# Patient Record
Sex: FEMALE | Race: WHITE
[De-identification: names, ages, dates, MRNs, and addresses within clinical notes are randomized per-mention and may not be internally consistent; named-entity substitution may affect disease eponyms.]

---

## 2023-05-02 ENCOUNTER — HOSPITAL ENCOUNTER (EMERGENCY)
Dept: HOSPITAL 12 - ER | Age: 33
Discharge: HOME | End: 2023-05-02
Payer: COMMERCIAL

## 2023-05-02 VITALS — SYSTOLIC BLOOD PRESSURE: 139 MMHG | DIASTOLIC BLOOD PRESSURE: 81 MMHG

## 2023-05-02 VITALS — HEIGHT: 64 IN | WEIGHT: 100 LBS | BODY MASS INDEX: 17.07 KG/M2

## 2023-05-02 DIAGNOSIS — R07.89: ICD-10-CM
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
## 2023-05-02 DIAGNOSIS — S43.401A: Primary | ICD-10-CM
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
## 2023-05-02 DIAGNOSIS — R51.9: ICD-10-CM

## 2023-05-02 LAB
ALP SERPL-CCNC: 57 U/L (ref 50–136)
ALT SERPL W/O P-5'-P-CCNC: 29 U/L (ref 14–59)
AST SERPL-CCNC: 21 U/L (ref 15–37)
BILIRUB DIRECT SERPL-MCNC: 0.2 MG/DL (ref 0–0.2)
BILIRUB SERPL-MCNC: 0.7 MG/DL (ref 0.2–1)
BUN SERPL-MCNC: 9 MG/DL (ref 7–18)
CHLORIDE SERPL-SCNC: 106 MMOL/L (ref 98–107)
CO2 SERPL-SCNC: 29 MMOL/L (ref 21–32)
CREAT SERPL-MCNC: 0.8 MG/DL (ref 0.6–1.3)
GLUCOSE SERPL-MCNC: 89 MG/DL (ref 74–106)
HCG UR QL: NEGATIVE
HCT VFR BLD AUTO: 40.2 % (ref 31.2–41.9)
MCH RBC QN AUTO: 29.5 UUG (ref 24.7–32.8)
MCV RBC AUTO: 89.7 FL (ref 75.5–95.3)
PLATELET # BLD AUTO: 176 K/UL (ref 179–408)
POTASSIUM SERPL-SCNC: 3.7 MMOL/L (ref 3.5–5.1)
WS STN SPEC: 8.3 G/DL (ref 6.4–8.2)

## 2023-05-02 PROCEDURE — 71045 X-RAY EXAM CHEST 1 VIEW: CPT

## 2023-05-02 PROCEDURE — 80076 HEPATIC FUNCTION PANEL: CPT

## 2023-05-02 PROCEDURE — 80048 BASIC METABOLIC PNL TOTAL CA: CPT

## 2023-05-02 PROCEDURE — 96372 THER/PROPH/DIAG INJ SC/IM: CPT

## 2023-05-02 PROCEDURE — 73060 X-RAY EXAM OF HUMERUS: CPT

## 2023-05-02 PROCEDURE — 85025 COMPLETE CBC W/AUTO DIFF WBC: CPT

## 2023-05-02 PROCEDURE — 99285 EMERGENCY DEPT VISIT HI MDM: CPT

## 2023-05-02 PROCEDURE — 36415 COLL VENOUS BLD VENIPUNCTURE: CPT

## 2023-05-02 PROCEDURE — 73030 X-RAY EXAM OF SHOULDER: CPT

## 2023-05-02 PROCEDURE — 84703 CHORIONIC GONADOTROPIN ASSAY: CPT

## 2023-05-02 PROCEDURE — 70450 CT HEAD/BRAIN W/O DYE: CPT

## 2023-05-02 PROCEDURE — A4663 DIALYSIS BLOOD PRESSURE CUFF: HCPCS

## 2023-05-02 PROCEDURE — 93005 ELECTROCARDIOGRAM TRACING: CPT
